# Patient Record
Sex: FEMALE | Race: WHITE | NOT HISPANIC OR LATINO | Employment: FULL TIME | URBAN - METROPOLITAN AREA
[De-identification: names, ages, dates, MRNs, and addresses within clinical notes are randomized per-mention and may not be internally consistent; named-entity substitution may affect disease eponyms.]

---

## 2023-06-13 ENCOUNTER — TRANSFERRED RECORDS (OUTPATIENT)
Dept: HEALTH INFORMATION MANAGEMENT | Facility: CLINIC | Age: 43
End: 2023-06-13
Payer: COMMERCIAL

## 2023-06-14 ENCOUNTER — TRANSCRIBE ORDERS (OUTPATIENT)
Dept: OTHER | Age: 43
End: 2023-06-14

## 2023-06-14 ENCOUNTER — MEDICAL CORRESPONDENCE (OUTPATIENT)
Dept: HEALTH INFORMATION MANAGEMENT | Facility: CLINIC | Age: 43
End: 2023-06-14
Payer: COMMERCIAL

## 2023-08-13 ENCOUNTER — HEALTH MAINTENANCE LETTER (OUTPATIENT)
Age: 43
End: 2023-08-13

## 2023-10-05 VITALS — BODY MASS INDEX: 33.12 KG/M2 | HEIGHT: 64 IN | WEIGHT: 194 LBS

## 2023-10-05 NOTE — PROGRESS NOTES
"Vandana is a 43 year old who is being evaluated via a billable video visit.      The patient has been notified of following:     \"This video visit will be conducted via a call between you and your physician/provider. We have found that certain health care needs can be provided without the need for an in-person physical exam.  This service lets us provide the care you need with a video conversation.  If a prescription is necessary we can send it directly to your pharmacy.  If lab work is needed we can place an order for that and you can then stop by our lab to have the test done at a later time.    Video visits are billed at different rates depending on your insurance coverage.  Please reach out to your insurance provider with any questions.    If during the course of the call the physician/provider feels a video visit is not appropriate, you will not be charged for this service.\"    Patient has given verbal consent for Video visit? Yes    How would you like to obtain your AVS? MyChart    If the video visit is dropped, the invitation should be resent by: MY CHART    Will anyone else be joining your video visit? No    I    Video-Visit Details    Type of service:  Video Visit    Video Start Time: 8:04 AM    Video End Time:9:05 AM    Originating Location (pt. Location): Home    Distant Location (provider location):  [facility] [place]     Platform used for Video Visit: [facility] Management Consult    PATIENT:  Vandana Chavez   MRN:         [id number]   :         1980  RADHA:         10/6/2023      Dear Physician No Ref-Primary,    I had the pleasure of seeing your patient, Vandana Chavez. Full intake/assessment was done to determine barriers to weight loss success and develop a treatment plan. Vandana Chavez is a 43 year old female interested in treatment of medical problems associated with excess weight. She has a height of 5' 4\", a weight of 194 lbs 0 oz, " "and the calculated Body mass index is 33.3 kg/m .    Assessment & Plan   Problem List Items Addressed This Visit       Prediabetes - Primary     Metformin/GLP-1 Start         Relevant Medications    metFORMIN (GLUCOPHAGE XR) 500 MG 24 hr tablet    Semaglutide-Weight Management (WEGOVY) 0.5 MG/0.5ML pen    Semaglutide-Weight Management (WEGOVY) 1 MG/0.5ML pen    Semaglutide-Weight Management (WEGOVY) 0.25 MG/0.5ML pen    Other Relevant Orders    Comprehensive metabolic panel    Class 1 obesity due to excess calories with serious comorbidity and body mass index (BMI) of 31.0 to 31.9 in adult     10/6/2023 [facility] Initial Dietician, Metformin Start, Wegovy PA         Relevant Medications    metFORMIN (GLUCOPHAGE XR) 500 MG 24 hr tablet    Semaglutide-Weight Management (WEGOVY) 0.5 MG/0.5ML pen    Semaglutide-Weight Management (WEGOVY) 1 MG/0.5ML pen    Semaglutide-Weight Management (WEGOVY) 0.25 MG/0.5ML pen    Other Relevant Orders    Comprehensive metabolic panel    Vitamin D Screen     Other Visit Diagnoses       BMI 31.0-31.9,adult                 PROGRAM OVERVIEW  Reviewed options at [facility].   All questions were answered. Education provided on chronic disease management of obesity.     MEDICATIONS:  We discussed healthy habits to assist with weight loss. We reviewed medications associated with weight gain. We discussed the role of pharmacological agents in the treatment of obesity and the \"off-label\" use of medications in this practice. We reviewed medication that may assist with weight loss. Indications, contraindications, risks/benefits, and potential side effects were discussed.   Metformin was prescribed and Wegovy. If wegovy covered.  Pt to obtain the 0.25 mg,0.5 mg , and 1 mg dose before beginning treatment.   Discussed that medications must always be used together with lifestyle changes such as improvements in diet choices, portion control and establishing and maintaining a regular exercise " "program. Reviewed rationale for long term use of pharmacotherapy in chronic disease management for obesity.      AOM Considerations: Pt safe of all AOM except Topiramate.  She in not on birth control.  Hs prediabetes.  If metformin, does not help with satiety/wt los, will go to phentermine next.               PATIENT INSTRUCTIONS:  Start Metformin  Directions: Take 1 tablet by mouth daily with a meal for 1 week, then increase to 2 tablets daily with a meal, if tolerating can increase to 3 tablets daily with a meal or at bedtime.     I ordered Wegovy to your pharmacy.  You may need to be persistent and patient to get these initial dosages due to the shortage.  Once you are able to obtain the 0.25 mg,0.5 mg , and 1 mg dose \"12 weeks of therapy\" contact clinic via Or[employer]It you can begin treatment.      Labs ordered.  Please call [phone] to set up a lab appt.     She [facility] as well as recent BP/Pulse and send through [facility].     FOLLOW-UP:  Please call [phone] to schedule your next visit in 3 mo    66 minutes spent on the date of the encounter doing chart review, history and exam, review test results, counseling, developing plan of care, documentation, and further activities as noted above.      She has the following co-morbidities:        9/30/2023     9:23 AM   --   I have the following health issues associated with obesity Pre-Diabetes   I have the following symptoms associated with obesity None of the above            No data to display                    9/30/2023     9:23 AM   Referring Provider   Please name the provider who referred you to Medical Weight Management  If you do not know, please answer \"I Don't Know\" [facility]           9/30/2023     9:23 AM   Weight History   How concerned are you about your weight? Somewhat Concerned   I became overweight After Pregnancy   The following factors have contributed to my weight gain Mental Health Issues    Change in Schedule    " Lack of Exercise    Stress   I have tried the following methods to lose weight Watching Portions or Calories    Meal Replacements    Fasting   My lowest weight since age 20 was 130   My highest weight since age 20 was 195   The most weight I have ever lost was (lbs) 20   I have the following family history of obesity/being overweight My mother is overweight   How has your weight changed over the last year? Gained   How many pounds? 45     Pt is teo eter.  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  After college ran triathacons for 6 years. Saw results.  Then stopped doing it.  Life got in the way.  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  Went into [employer].  Stopped biking, running, swimming.  1st year of teaching was stressful.  Got marrid in 2017.  Had goal of losing weight before wedding.  With stress weight comes on.  Has 5 year old  was at 190 lbs and been the same weight since pregnancy.  Clinic [facility] referred her here.  Tried several diet attempts and has not seeing weight loss.      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  When striched out she goes to easy quick snacks.          9/30/2023     9:23 AM   Diet Recall Review with Patient   If you do eat breakfast, what types of food do you eat? Wake up 5:30-6:30  6:30-7:00 am banana, bread, yogurt, smoothie- veggie based   If you do eat lunch, what types of food do you typically eat? 12:00 smoothie, left overs, sandwich-sour dough   If you do eat supper, what types of food do you typically eat? Starving after getting back home from school  Will go for a carb snack    Supper: a protein with veggies or fruit   If you do snack, what types of food do you typically eat? veggie straws, apple, banana, crackers, cheese   How many glasses of juice do you drink in a typical day? 0   How many of glasses of milk do you drink in a typical day? 0   How many 8oz glasses of sugar containing drinks such as Randy-Aid/sweet tea do you drink in a day? 0   How many  cans/bottles of sugar pop/soda/tea/sports drinks do you drink in a day? 0   How many cans/bottles of diet pop/soda/tea or sports drink do you drink in a day? 1   How often do you have a drink of alcohol? 2-4 Times a Month   If you do drink, how many drinks might you have in a day? 1 or 2           9/30/2023     9:23 AM   Eating Habits   Generally, my meals include foods like these bread, pasta, rice, potatoes, corn, crackers, sweet dessert, pop, or juice Almost Everyday   Generally, my meals include foods like these fried meats, brats, burgers, french fries, pizza, cheese, chips, or ice cream A Few Times a Week   Eat fast food (like McDonalds, Burger Jesus, Tac[employer] Bell) Never   Eat at a buffet or sit-down restaurant Less Than Weekly   Eat most of my meals in front of the TV or computer Never   Often skip meals, eat at random times, have no regular eating times A Few Times a Week   Rarely sit down for a meal but snack or graze throughout Once a Week   Eat extra snacks between meals A Few Times a Week   Eat most of my food at the end of the day Less Than Weekly   Eat in the middle of the night or wake up at night to eat Never   Eat extra snacks to prevent or correct low blood sugar A Few Times a Week   Eat to prevent acid reflux or stomach pain Less Than Weekly   Worry about not having enough food to eat Never   I eat when I am depressed Less Than Weekly   I eat when I am stressed A Few Times a Week   I eat when I am bored A Few Times a Week   I eat when I am anxious A Few Times a Week   I eat when I am happy or as a reward Once a Week   I feel hungry all the time even if I just have eaten Less Than Weekly   Feeling full is important to me A Few Times a Week   I finish all the food on my plate even if I am already full A Few Times a Week   I can't resist eating delicious food or walk past the good food/smell Less Than Weekly   I eat/snack without noticing that I am eating Less Than Weekly   I eat when I am preparing the  meal Once a Week   I eat more than usual when I see others eating Once a Week   I have trouble not eating sweets, ice cream, cookies, or chips if they are around the house Once a Week   I think about food all day Once a Week   What foods, if any, do you crave? Chips/Crackers   Please list any other foods you crave? cheese   Before menses- she gets hunger.  Crave more carbs.          9/30/2023     9:23 AM   Amount of Food   I feel out of control when eating Weekly- less control over what she is eating- in afternoon.  Grabs for what ever is easiest   I eat a large amount of food, like a loaf of bread, a box of cookies, a pint/quart of ice cream, all at once Never   I eat a large amount of food even when I am not hungry Never   I eat rapidly Weekly   I eat alone because I feel embarrassed and do not want others to see how much I have eaten Never   I eat until I am uncomfortably full Almost Everyday   I feel bad, disgusted, or guilty after I overeat Weekly   Will at time eat everything on her plate.When everything is at the table.  Is has a hard time realizing when she is not hungry anymore.         9/30/2023     9:23 AM   Activity/Exercise History   How much of a typical 12 hour day do you spend sitting? Half the Day   How much of a typical 12 hour day do you spend lying down? Less Than Half the Day   How much of a typical day do you spend walking/standing? Half the Day   How many hours (not including work) do you spend on the TV/Video Games/Computer/Tablet/Phone? 1 Hour or Less   How many times a week are you active for the purpose of exercise? Once a Week   What keeps you from being more active? Lack of Time   How many total minutes do you spend doing some activity for the purpose of exercising when you exercise? 15-30 Minutes       PAST MEDICAL HISTORY:  Past Medical History:   Diagnosis Date    Prediabetes            9/30/2023     9:23 AM   Work/Social History Reviewed With Patient   My employment status is  Full-Time   My job is    How much of your job is spent on the computer or phone? 50%   How many hours do you spend commuting to work daily? 20 minute's   What is your marital status? /In a Relationship   If in a relationship, is your significant other overweight? No   If you have children, are they overweight? No   Who do you live with? , son and two cats   Who does the food shopping? Me and my    - Cricket Smith son.      Social History     Tobacco Use    Smoking status: Never    Smokeless tobacco: Never   Substance Use Topics    Alcohol use: Yes     Alcohol/week: 2.0 standard drinks of alcohol     Types: 2 Glasses of wine per week    Drug use: Never            9/30/2023     9:23 AM   Mental Health History Reviewed With Patient   Have you ever been physically or sexually abused? No   How often in the past 2 weeks have you felt little interest or pleasure in doing things? Not at all   Over the past 2 weeks how often have you felt down, depressed, or hopeless? Not at all           9/30/2023     9:23 AM   Questions Reviewed With Patient   How ready are you to make changes regarding your weight? Number 1 = Not ready at all to make changes up to 10 = very ready. 7   How confident are you that you can change? 1 = Not confident that you will be successful making changes up to 10 = very confident. 5           9/30/2023     9:23 AM   Sleep History Reviewed With Patient   How many hours do you sleep at night? 7   Bedtime 9:30-10:00     MEDICATIONS:   Current Outpatient Medications   Medication Sig Dispense Refill    metFORMIN (GLUCOPHAGE XR) 500 MG 24 hr tablet Take 1 tablet (500 mg) by mouth daily (with dinner) for 7 days, THEN 2 tablets (1,000 mg) daily (with dinner) for 7 days, THEN 3 tablets (1,500 mg) daily (with dinner) for 76 days. 270 tablet 1    Semaglutide-Weight Management (WEGOVY) 0.25 MG/0.5ML pen Inject 0.25 mg Subcutaneous once a week 2 mL 0    Semaglutide-Weight  "Management (WEGOVY) 0.5 MG/0.5ML pen Inject 0.5 mg Subcutaneous once a week 2 mL 0    Semaglutide-Weight Management (WEGOVY) 1 MG/0.5ML pen Inject 1 mg Subcutaneous once a week 2 mL 0       ALLERGIES:   Not on File    ROS:    HEENT  H/O glaucoma:  no  Cardiovascular  CAD:   no  Palpitations:   no  HTN:    No, BP/Pulse WNL  Gastrointestinal  GERD:   no  Constipation:   no  Liver Dz:   no  H/O Pancreatitis:  no  H/O Gallbladder Dz: no  Psychiatric  Moods Stable:  yes  Anxiety:   Yes, clincal diagnosed  Depression:  no  Bipolar:  no  H/O ETOH/Drug abuse: no  H/O eating disorder: no  Endocrine  PMH/FMH of MTC or MEN2:  no  Neurologic:  H/O seizures:   no  Headaches:  no  Memory Impairment:  no    H/O kidney stones:  no  Kidney disease:  no  Current birth control:  None    LABS/RECORDS REVIEWED:  No results found for: A1C, VITDT, TSH, NA, POTASSIUM, CHLORIDE, CO2, ANIONGAP, GLC, BUN, CR, GFRESTIMATED, JUAN A, BILITOTAL, ALKPHOS, ALT, AST, CHOL, HDL, LDL, TRIG, WBC, HGB, HCT, MCV, PLT        PHYSICAL EXAM:  Ht 5' 4\" (1.626 m)   Wt 194 lb (88 kg)   BMI 33.30 kg/m    GENERAL: Healthy, alert and no distress  EYES: Eyes grossly normal to inspection.  No discharge or erythema, or obvious scleral/conjunctival abnormalities.  RESP: No audible wheeze, cough, or visible cyanosis.  No visible retractions or increased work of breathing.    SKIN: Visible skin clear. No significant rash, abnormal pigmentation or lesions.  NEURO: Cranial nerves grossly intact.  Mentation and speech appropriate for age.  PSYCH: Mentation appears normal, affect normal/bright, judgement and insight intact, normal speech and appearance well-groomed.    COUNSELING:   Reviewed obesity as a chronic disease and comprehensive management stratagies.      We discussed Bariatric Basics including:  -eating 3 meals daily  -eating protein first  -eating slowly, chewing food well  -avoiding/limiting calorie containing beverages  -limiting carbohydrates and changing " to whole grains  -limiting restaurant or cafeteria eating to twice a week or less    We discussed the importance of restorative sleep and stress management in maintaining a healthy weight.  We discussed insulin resistance and glycemic index as it relates to appetite and weight control.   We discussed the importance of physical activity including cardiovascular and strength training in maintaining a healthier weight and explored viable options.  Patient education of above written in AVS.      Sincerely,    Dana Rod PA-C

## 2023-10-06 ENCOUNTER — VIRTUAL VISIT (OUTPATIENT)
Dept: SURGERY | Facility: CLINIC | Age: 43
End: 2023-10-06
Payer: COMMERCIAL

## 2023-10-06 DIAGNOSIS — E66.811 CLASS 1 OBESITY DUE TO EXCESS CALORIES WITH SERIOUS COMORBIDITY AND BODY MASS INDEX (BMI) OF 31.0 TO 31.9 IN ADULT: ICD-10-CM

## 2023-10-06 DIAGNOSIS — E66.09 CLASS 1 OBESITY DUE TO EXCESS CALORIES WITH SERIOUS COMORBIDITY AND BODY MASS INDEX (BMI) OF 31.0 TO 31.9 IN ADULT: ICD-10-CM

## 2023-10-06 DIAGNOSIS — R73.03 PREDIABETES: Primary | ICD-10-CM

## 2023-10-06 PROCEDURE — 99205 OFFICE O/P NEW HI 60 MIN: CPT | Mod: VID | Performed by: PHYSICIAN ASSISTANT

## 2023-10-06 PROCEDURE — 97803 MED NUTRITION INDIV SUBSEQ: CPT | Mod: VID

## 2023-10-06 RX ORDER — METFORMIN HCL 500 MG
TABLET, EXTENDED RELEASE 24 HR ORAL
Qty: 270 TABLET | Refills: 1 | Status: SHIPPED | OUTPATIENT
Start: 2023-10-06 | End: 2024-01-03

## 2023-10-06 RX ORDER — SEMAGLUTIDE 1 MG/.5ML
1 INJECTION, SOLUTION SUBCUTANEOUS WEEKLY
Qty: 2 ML | Refills: 0 | Status: SHIPPED | OUTPATIENT
Start: 2023-10-06

## 2023-10-06 RX ORDER — SEMAGLUTIDE 0.25 MG/.5ML
0.25 INJECTION, SOLUTION SUBCUTANEOUS WEEKLY
Qty: 2 ML | Refills: 0 | Status: SHIPPED | OUTPATIENT
Start: 2023-10-06

## 2023-10-06 NOTE — PROGRESS NOTES
"Virtual Visit Details    Type of service:  Video Visit     Originating Location (pt. Location): Home    Distant Location (provider location):  On-site  Platform used for Video Visit: [facility] WEIGHT LOSS INITIAL EVALUATION  DIAGNOSIS:  Obese, class I     NUTRITION HISTORY:  Breakfast: smoothies -coconut water, almond + fruit + spinach + wheat germ/flaxseed or oats  Lunch: smoothies (12:00)   Dinner: protein (chili) sour cream, cheese and corn bread   Snacks: Triscuits   Beverage choices: water  Dining out: varies   Binge eating: no  Emotional eating: yes  Night time eating: no  Exercise: 1 time per week. Patient used to participate in triathlons.   Additional Information: Patient lives with her  and 5 year old son.  Patient states she is a picky eater and feels her son has her eating habits.  Patient feels when she is hungry that she grabs unhealthy foods.  Patient knows she is a stress eater. Patient works as .     ANTHROPOMETRICS:  Height: 5'4\"   Weight: 194 lbs patient reported   BMI: 33.3 kg/m2  NUTRITION DIAGNOSIS:   Obese class I related to overeating and poor lifestyle habits as evidence by patient's subjective statements and  BMI of 33.3 kg/m2   NUTRITION INTERVENTIONS  Nutrition Prescription:  Recommend modified energy- nutrient intake  Implementation:  Nutrition Education (Content):  Discussed portion sizes, protein options, label reading and carbohydrate counting   Reviewed healthy snacking and beverage choices  Provided: My Plate; Protein Sources for Weight Loss     Nutrition Education (Application):   Patient to practice goals as stated below  Patient verbalizes understanding of diet by stating will eat planned snack  Expected patient engagement: good     Goals:  Eat planned afternoon snack   Add protein at breakfast     FOLLOW UP AND MONITORING:   Other  - follow up in 4-8 weeks.     TIME SPENT WITH PATIENT:   28 minutes   Mignon Bella, RD, " MARCIE  [facility] Outpatient Dietitian/[facility]   [phone] (office phone)

## 2023-10-06 NOTE — PATIENT INSTRUCTIONS
Sandoval Ross,    It was nice meeting you this morning.  Here are the goals we discussed:    Add protein source in the morning  Eat planned afternoon snack      My Plate   [email or web address]    Protein Sources for Weight Loss  [email or web address]     Please call [phone] to schedule your next RD appointment in 4-8 weeks.    [place]    Take care,    Mignon Shelton, RD, LD  [facility] Outpatient Dietitian/[facility] Loss Clinic   [phone] (office phone)

## 2023-10-06 NOTE — PATIENT INSTRUCTIONS
"To ensure quality you may receive a patient satisfaction survey. The greatest compliment you can give is \"Likely to Recommend.\"    Nice to talk with you today.  Thank you for your trust. Below is the plan discussed.-  SHRUTHI Cortez      Plan:    Goals:      FOLLOW-UP:    Please call [phone] to schedule your next visit.       "

## 2023-10-10 ENCOUNTER — TELEPHONE (OUTPATIENT)
Dept: SURGERY | Facility: CLINIC | Age: 43
End: 2023-10-10
Payer: COMMERCIAL

## 2023-10-10 NOTE — TELEPHONE ENCOUNTER
Prior Authorization Retail Medication Request    Medication/Dose: Semaglutide-Weight Management (WEGOVY) 0.5 MG/0.5ML pen 2 mL 0 10/6/2023   Sig:   Inject 0.5 mg Subcutaneous once a week    Route:   Subcutaneous    Class:   E-Prescribe    Semaglutide-Weight Management (WEGOVY) 1 MG/0.5ML pen 2 mL 0 10/6/2023   Sig:   Inject 1 mg Subcutaneous once a week    Route:   Subcutaneous    Class:   E-Prescribe    Semaglutide-Weight Managemen        ICD code (if different than what is on RX):  R73.03; E66.09; and Z69.31  Previously Tried and Failed:  Diet and lifestyle  Rationale:  weight management, pre-diabetes    Hx of obesity  Body mass index is 33.30 kg/m .   Current Weight:  194* lb (88 kg)       Insurance Name:  [employer] CHOICE   Insurance ID:  [id number]       Pharmacy Information (if different than what is on RX)  Name:    [facility]/PHARMACY #[id number] [facility] [address] [address]     Phone: [phone]

## 2023-10-12 ENCOUNTER — TELEPHONE (OUTPATIENT)
Dept: SURGERY | Facility: CLINIC | Age: 43
End: 2023-10-12

## 2023-10-12 NOTE — TELEPHONE ENCOUNTER
Prior Authorization Approval    Medication: WEGOVY 1 MG/0.5ML SC SOAJ  Authorization Effective Date: 9/12/2023  Authorization Expiration Date: 5/9/2024  Approved Dose/Quantity:   Reference #:     Insurance Company: [employer]-Specialty [employer]'s - Phone [phone] Fax [phone]  Expected CoPay: $    CoPay Card Available:      Financial Assistance Needed:   Which Pharmacy is filling the prescription: [facility]/PHARMACY #[id number] [place] [address]  Pharmacy Notified: Yes  Patient Notified: **Instructed pharmacy to notify patient when script is ready to /ship.*

## 2023-10-12 NOTE — TELEPHONE ENCOUNTER
Prior Authorization Retail Medication Request    Medication/Dose: Semaglutide-Weight Management (WEGOVY) 1 MG/0.5ML pen   ICD code (if different than what is on RX):    Previously Tried and Failed:    Rationale:      Insurance Name:    Insurance ID:          Pharmacy Information (if different than what is on RX)  Name:    [facility]/PHARMACY #[id number] [facility] [address] [address]

## 2023-10-16 NOTE — TELEPHONE ENCOUNTER
Prior Authorization Not Needed per Insurance    Medication: WEGOVY 0.5 MG/0.5ML SC SOAJ  Insurance Company: [employer] - Phone [phone] Fax [phone]  Expected CoPay: $    Pharmacy Filling the Rx: [id number] - [facility] [address] [facility]

## 2023-11-09 DIAGNOSIS — R73.03 PREDIABETES: ICD-10-CM

## 2023-11-09 DIAGNOSIS — E66.811 CLASS 1 OBESITY DUE TO EXCESS CALORIES WITH SERIOUS COMORBIDITY AND BODY MASS INDEX (BMI) OF 31.0 TO 31.9 IN ADULT: Primary | ICD-10-CM

## 2023-11-09 DIAGNOSIS — E66.09 CLASS 1 OBESITY DUE TO EXCESS CALORIES WITH SERIOUS COMORBIDITY AND BODY MASS INDEX (BMI) OF 31.0 TO 31.9 IN ADULT: Primary | ICD-10-CM

## 2023-11-09 NOTE — PATIENT INSTRUCTIONS
MEDICATION STARTED AT THIS APPOINTMENT    We are starting a GLP-1 (Glucagon-like Peptide-1) medication called Saxenda. One of the ways it works is by slowing down the rate that food leaves your stomach. You feel ray and will eat less. It also helps regulate hormones that can help improve your blood sugars.    If you are a patient that checks blood sugars, continue to check as instructed by your doctor. Low blood sugars are rare but can happen if patients are on insulin or other oral agents. If you notice consistent low sugars or high sugars, your medication may need to be adjusted after your appointment. If this is the case, please call RN and provide her your blood sugar record from the last 3-4 days. The RN will get in touch with the doctor and call you back/[employer] message with recommendations. We tolerate high sugars for a bit, so if sugars are running 180-200, this is ok. As weight starts dropping the blood sugars should too. If readings are consistently over 200 for 1-2 weeks, then you should call the doctor/nurse.    Dosing for this medication:   Week 1- Inject 0.6 mg daily  Week 2- Inject 1.2 mg daily  Week 3- Inject 1.8 mg daily  Week 4- Inject 2.4 mg daily  Week 5 and thereafter- Inject 3.0 mg daily    Side effects of GLP- Medications include: The most common side effects are all GI related and consist of: nausea, constipation, diarrhea, burping, or gassiness. Patients are advised to eat slowly and less, and nausea typically passes if people can stick it out.     The risk of pancreatitis (inflammation of the pancreas) has been associated with this type of medication, but is very rare.  If you have had pancreatitis in the past, this medication may not be for you. Please let us know about any past history of pancreas problems.    Symptoms of pancreatitis include: Pain in your upper stomach area which may travel to your back and be worse after eating. Your stomach area may be tender to the touch.  You may  have vomiting or nausea and/or have a fever. If you should develop any of these symptoms, stop the medication and contact your primary care doctor. They will do a blood test to check for pancreatitis.         There is a small chance you may have some low blood sugar after taking the medication.   The signs of low blood sugar are:  Weakness  Shaky   Hungry  Sweating  Confusion      See below for ways to treat low blood sugar without adding in lots of extra calories.      Treating Low Blood Sugar    If you have symptoms of low blood sugar (sweating, shaking, dizzy, confused) eat 15 grams of carbs and wait 15 minutes:    Glucose Tabs are best for sugars under 70 -  Dex4 or BD Glucose tablets are good, you will need to take 3-4 of these to equal 15 grams.     One small box of raisins  4 oz fruit juice box or   cup fruit juice  1 small apple  1 small banana    cup canned fruit in water    English muffin or a slice of bread with jelly   1 low fat frozen waffle with sugar-free syrup    cup cottage cheese with   cup frozen or fresh blueberries  1 cup skim or low-fat milk    cup whole grain cereal  4-6 crackers such as Triscuits      This medication is usually not covered by insurance and can be quite expensive. Sometimes a prior authorization is required, which may take up to 1-2 weeks for an insurance company to make a decision if they will cover the medication. Please be patient, you will be notified after a decision has been made.    Contact the nurse via [employer] or call [phone] if you have any questions or concerns. (Do not stop taking it if you don't think it's working. For some people it works without them knowing it.)     In order to get refills of this or any medication we prescribe you must be seen in the medical weight mgmt clinic every 2-4 months.

## 2023-11-09 NOTE — PROGRESS NOTES
Previously prescribed wegovy - unable to get d/t shortage. Plan for Saxenda instead. Covering for Dana who was out of clinic when patient messaged about Metformin/Wegovy.    STEVE CabreraC

## 2024-03-10 ENCOUNTER — HEALTH MAINTENANCE LETTER (OUTPATIENT)
Age: 44
End: 2024-03-10

## 2024-10-06 ENCOUNTER — HEALTH MAINTENANCE LETTER (OUTPATIENT)
Age: 44
End: 2024-10-06